# Patient Record
Sex: FEMALE | Race: WHITE | NOT HISPANIC OR LATINO | URBAN - METROPOLITAN AREA
[De-identification: names, ages, dates, MRNs, and addresses within clinical notes are randomized per-mention and may not be internally consistent; named-entity substitution may affect disease eponyms.]

---

## 2024-02-12 ENCOUNTER — TRANSFERRED RECORDS (OUTPATIENT)
Dept: HEALTH INFORMATION MANAGEMENT | Facility: CLINIC | Age: 24
End: 2024-02-12

## 2024-02-21 ENCOUNTER — TELEPHONE (OUTPATIENT)
Dept: OBGYN | Facility: CLINIC | Age: 24
End: 2024-02-21
Payer: COMMERCIAL

## 2024-02-21 NOTE — TELEPHONE ENCOUNTER
[facility] Call Center    Phone Message    May a detailed message be left on voicemail: yes     Reason for Call: Other: Pt is looking to establish care and find a provider that will perform a bilateral salpingectomy. Please contact pt.       Action Taken: Message routed to:  Other: S    Travel Screening: Not Applicable

## 2024-03-12 ENCOUNTER — OFFICE VISIT (OUTPATIENT)
Dept: OBGYN | Facility: CLINIC | Age: 24
End: 2024-03-12
Attending: OBSTETRICS & GYNECOLOGY
Payer: COMMERCIAL

## 2024-03-12 ENCOUNTER — TELEPHONE (OUTPATIENT)
Dept: OBGYN | Facility: CLINIC | Age: 24
End: 2024-03-12
Payer: COMMERCIAL

## 2024-03-12 ENCOUNTER — PATIENT OUTREACH (OUTPATIENT)
Dept: ONCOLOGY | Facility: CLINIC | Age: 24
End: 2024-03-12
Payer: COMMERCIAL

## 2024-03-12 VITALS
HEIGHT: 65 IN | WEIGHT: 160 LBS | BODY MASS INDEX: 26.66 KG/M2 | HEART RATE: 103 BPM | SYSTOLIC BLOOD PRESSURE: 133 MMHG | DIASTOLIC BLOOD PRESSURE: 82 MMHG

## 2024-03-12 DIAGNOSIS — Z30.2 REQUEST FOR STERILIZATION: Primary | ICD-10-CM

## 2024-03-12 DIAGNOSIS — Z80.3 FAMILY HISTORY OF MALIGNANT NEOPLASM OF BREAST: ICD-10-CM

## 2024-03-12 PROCEDURE — 99213 OFFICE O/P EST LOW 20 MIN: CPT | Performed by: OBSTETRICS & GYNECOLOGY

## 2024-03-12 PROCEDURE — 99203 OFFICE O/P NEW LOW 30 MIN: CPT | Performed by: OBSTETRICS & GYNECOLOGY

## 2024-03-12 RX ORDER — HYDROXYZINE PAMOATE 50 MG/1
CAPSULE ORAL
COMMUNITY
Start: 2024-02-26 | End: 2024-07-02

## 2024-03-12 RX ORDER — HYDROXYZINE HYDROCHLORIDE 25 MG/1
TABLET, FILM COATED ORAL
COMMUNITY
End: 2024-03-12

## 2024-03-12 RX ORDER — KETOCONAZOLE 20 MG/ML
SHAMPOO TOPICAL
COMMUNITY
Start: 2024-01-08

## 2024-03-12 RX ORDER — DUPILUMAB 300 MG/2ML
INJECTION, SOLUTION SUBCUTANEOUS
COMMUNITY

## 2024-03-12 RX ORDER — DEXTROAMPHETAMINE SACCHARATE, AMPHETAMINE ASPARTATE MONOHYDRATE, DEXTROAMPHETAMINE SULFATE AND AMPHETAMINE SULFATE 2.5; 2.5; 2.5; 2.5 MG/1; MG/1; MG/1; MG/1
20 CAPSULE, EXTENDED RELEASE ORAL
COMMUNITY
Start: 2023-06-06 | End: 2024-03-12

## 2024-03-12 RX ORDER — VENLAFAXINE HYDROCHLORIDE 37.5 MG/1
CAPSULE, EXTENDED RELEASE ORAL
COMMUNITY
Start: 2024-02-26 | End: 2024-07-02

## 2024-03-12 RX ORDER — VENLAFAXINE HYDROCHLORIDE 75 MG/1
CAPSULE, EXTENDED RELEASE ORAL
COMMUNITY
End: 2024-07-02

## 2024-03-12 RX ORDER — DEXTROAMPHETAMINE SACCHARATE, AMPHETAMINE ASPARTATE, DEXTROAMPHETAMINE SULFATE AND AMPHETAMINE SULFATE 5; 5; 5; 5 MG/1; MG/1; MG/1; MG/1
TABLET ORAL
COMMUNITY
Start: 2023-07-12 | End: 2024-03-12

## 2024-03-12 RX ORDER — DEXTROAMPHETAMINE SACCHARATE, AMPHETAMINE ASPARTATE MONOHYDRATE, DEXTROAMPHETAMINE SULFATE AND AMPHETAMINE SULFATE 5; 5; 5; 5 MG/1; MG/1; MG/1; MG/1
CAPSULE, EXTENDED RELEASE ORAL
COMMUNITY
Start: 2024-02-12 | End: 2024-06-12

## 2024-03-12 RX ORDER — DEXTROAMPHETAMINE SACCHARATE, AMPHETAMINE ASPARTATE, DEXTROAMPHETAMINE SULFATE AND AMPHETAMINE SULFATE 1.25; 1.25; 1.25; 1.25 MG/1; MG/1; MG/1; MG/1
TABLET ORAL
COMMUNITY
End: 2024-03-12

## 2024-03-12 RX ORDER — ACETAMINOPHEN 325 MG/1
975 TABLET ORAL ONCE
Status: CANCELLED | OUTPATIENT
Start: 2024-03-12 | End: 2024-03-12

## 2024-03-12 RX ORDER — ATOMOXETINE 25 MG/1
CAPSULE ORAL
COMMUNITY
End: 2024-03-12

## 2024-03-12 RX ORDER — TRIAMCINOLONE ACETONIDE 1 MG/G
OINTMENT TOPICAL
COMMUNITY

## 2024-03-12 RX ORDER — ATOMOXETINE 40 MG/1
CAPSULE ORAL
COMMUNITY
End: 2024-03-12

## 2024-03-12 RX ORDER — AMMONIUM LACTATE 12 G/100G
CREAM TOPICAL
COMMUNITY

## 2024-03-12 ASSESSMENT — ANXIETY QUESTIONNAIRES
5. BEING SO RESTLESS THAT IT IS HARD TO SIT STILL: NOT AT ALL
2. NOT BEING ABLE TO STOP OR CONTROL WORRYING: SEVERAL DAYS
1. FEELING NERVOUS, ANXIOUS, OR ON EDGE: SEVERAL DAYS
IF YOU CHECKED OFF ANY PROBLEMS ON THIS QUESTIONNAIRE, HOW DIFFICULT HAVE THESE PROBLEMS MADE IT FOR YOU TO DO YOUR WORK, TAKE CARE OF THINGS AT HOME, OR GET ALONG WITH OTHER PEOPLE: NOT DIFFICULT AT ALL
8. IF YOU CHECKED OFF ANY PROBLEMS, HOW DIFFICULT HAVE THESE MADE IT FOR YOU TO DO YOUR WORK, TAKE CARE OF THINGS AT HOME, OR GET ALONG WITH OTHER PEOPLE?: NOT DIFFICULT AT ALL
GAD7 TOTAL SCORE: 6
6. BECOMING EASILY ANNOYED OR IRRITABLE: SEVERAL DAYS
3. WORRYING TOO MUCH ABOUT DIFFERENT THINGS: SEVERAL DAYS
GAD7 TOTAL SCORE: 6
7. FEELING AFRAID AS IF SOMETHING AWFUL MIGHT HAPPEN: SEVERAL DAYS
7. FEELING AFRAID AS IF SOMETHING AWFUL MIGHT HAPPEN: SEVERAL DAYS
4. TROUBLE RELAXING: SEVERAL DAYS

## 2024-03-12 ASSESSMENT — PATIENT HEALTH QUESTIONNAIRE - PHQ9
SUM OF ALL RESPONSES TO PHQ QUESTIONS 1-9: 3
10. IF YOU CHECKED OFF ANY PROBLEMS, HOW DIFFICULT HAVE THESE PROBLEMS MADE IT FOR YOU TO DO YOUR WORK, TAKE CARE OF THINGS AT HOME, OR GET ALONG WITH OTHER PEOPLE: NOT DIFFICULT AT ALL
SUM OF ALL RESPONSES TO PHQ QUESTIONS 1-9: 3

## 2024-03-12 NOTE — PATIENT INSTRUCTIONS
Thank you for trusting us with your care!     If you need to contact us for questions about:  Symptoms, Scheduling & Medical Questions; Non-urgent (2-3 day response) Thais message, Urgent (needing response today) [phone] (if after 3:30pm next day response)   Prescriptions: Please call your Pharmacy   Billing: [facility] [phone] or [facility]:[phone]

## 2024-03-12 NOTE — LETTER
3/12/2024       RE: Nancy Smith  [address]  [facility] [address]     Dear Colleague,    Thank you for referring your patient, Nancy Smith, to the [facility] [place] at [facility]. Please see a copy of my visit note below.    Chief Complaint   Patient presents with    Establish Care     Tubal ligation consult    Sharon Rosario LPN   Answers submitted by the patient for this visit:  Patient Health Questionnaire (Submitted on 3/12/2024)  If you checked off any problems, how difficult have these problems made it for you to do your work, take care of things at home, or get along with other people?: Not difficult at all  PHQ9 TOTAL SCORE: 3  LIDIA-7 (Submitted on 3/12/2024)  LIDIA 7 TOTAL SCORE: 6      [facility]  Gynecology Visit    Reason for Visit: Establish care and discuss bilateral salpingectomy    HPI:    Nancy Smith is a 24 year old female, G0, here to establish care and discuss bilateral salpingectomy. Patient began using contraception at 15yo with a hormonal OCP for 5 years then switch to a hormonal IUD at 20yo to present. Patient does not wish to have children, has not felt peace with her body due to severe fear of becoming pregnant and would not want to bring a child into current world environment. Patient has thought about potential of regretting decision, with her deciding she would prefer to regret this procedure than to have children, and would consider adoption if she ever changes her mind. Discussion of other forms of contraception that are equally as efficient in preventing pregnancy were discussed, with patient declining and wishing to proceed with sterilization and removal of IUD. Patient currently has light menstrual spotting once a month, typically at the end of the month, without abdominal pain. Denies nausea, vomiting. Has occasional headaches, attributes them to poor eating habits and Adderall appetite  "suppression. Has a mild bilateral hand tremor at rest and patient notices tremor when active but does not disrupt abilities. Patient reports has had recent Pap [facility], and wishes to have genetic testing for BRCA1 due to family history of mom with breast and cervical cancer, and report of other family with cancer history.    Remaining ROS negative.      OBHx  OB History    Para Term  AB Living   0 0 0 0 0 0   SAB IAB Ectopic Multiple Live Births   0 0 0 0 0       Past Medical History:   Diagnosis Date    ADHD (attention deficit hyperactivity disorder)        Past Surgical History:   Procedure Laterality Date    DENTAL SURGERY           Current Outpatient Medications:     ammonium lactate (AMLACTIN) 12 % external cream, , Disp: , Rfl:     amphetamine-dextroamphetamine (ADDERALL XR) 20 MG 24 hr capsule, , Disp: , Rfl:     DUPIXENT 300 MG/2ML prefilled pen, , Disp: , Rfl:     hydrOXYzine (VISTARIL) 50 MG capsule, , Disp: , Rfl:     ketoconazole (NIZORAL) 2 % external shampoo, , Disp: , Rfl:     metroNIDAZOLE (METROCREAM) 0.75 % external cream, , Disp: , Rfl:     sertraline (ZOLOFT) 50 MG tablet, , Disp: , Rfl:     triamcinolone (KENALOG) 0.1 % external ointment, , Disp: , Rfl:     venlafaxine (EFFEXOR XR) 37.5 MG 24 hr capsule, , Disp: , Rfl:     venlafaxine (EFFEXOR XR) 75 MG 24 hr capsule, , Disp: , Rfl:     Family History   Problem Relation Age of Onset    Breast Cancer Mother      Social History     Tobacco Use    Smoking status: Never    Smokeless tobacco: Never   Substance Use Topics    Alcohol use: Yes     Alcohol/week: 1.0 standard drink of alcohol     Types: 1 Glasses of wine per week    Drug use: Yes     Types: Marijuana       Physical Exam  /82   Pulse 103   Ht 1.645 m (5' 4.75\")   Wt 72.6 kg (160 lb)   BMI 26.83 kg/m    Gen: Well-appearing, NAD  HEENT: Normocephalic, atraumatic  CV:  RRR, no m/r/g auscultated  Pulm: CTAB, no w/r/r auscultated  Abd: Soft, " non-tender, non-distended  Ext: No LE edema, extremities warm and well perfused    Assessment/Plan:  Nancy Smith is a 24 year old female, G0, here to establish care and discuss bilateral salpingectomy for permanent contraception.     Nancy Smith requests surgical sterilization. We discussed that LARC methods of contraception, such as IUDs and implants, provide the same efficacy as permanent sterilization, are reversible and avoid the risks of general anesthesia, and could be placed today in the office. We discussed that surgical sterilization is permanent and irreversible, and that there is a possibility of regretting this decision in the future. I recommended laparoscopic bilateral salpingectomy, rather than tubal ligation, because this avoids the risk of procedure failure, and because of evidence that removing the entire fallopian tube can reduce the risk of ovarian cancer in the future. However, if salpingectomy is not possible at the time of surgery, I would still perform a tubal ligation in order to accomplish the goal of sterilization. We discussed the risks of surgery include deep vein thrombosis, injury to bowel, bladder, ureters, nerves, and blood vessels requiring repair or reoperation for repair. After this discussion, Nancy Smith still desired surgical sterilization, and agreed to the inherent risks of the procedure. I answered all of her questions and we signed the FTP consent form. We will plan to remove the IUD at the time of the laparoscopic bilateral salpingectomy.     We then discussed pre- and post-operative instructions. I performed a preoperative physical exam today and she needs no additional cardiovascular testing prior to surgery. If surgery is scheduled > 30d from today, then she understands she will need an updated preoperative physical exam.    I placed a referral to genetic counseling to discuss her family history and potential for BRCA testing. Nancy with start evaluation for bilateral  hand tremor with family medicine here at [facility].    Staffed with Dr. Diamond Matias, MS3     OBGYN Attending Addendum    I appreciate the note above by medical student, Vincenzo Matias.  I, Diamond Tam, was present with the medical student, who participated in the service and in the documentation of the note. I have verified the history and personally performed the physical exam and medical decision making. I have edited accordingly and agree with the assessment and plan of care as documented in the note.    Diamond Tam MD, MSCI    [facility]  03/12/24

## 2024-03-12 NOTE — PROGRESS NOTES
Writer received Cancer Risk Management Program referral, referred for:    desires brca testing due to family history of breast cancer      Reviewed for appropriate plan, and sent to New Patient Scheduling for completion.

## 2024-03-12 NOTE — PROGRESS NOTES
Chief Complaint   Patient presents with    Establish Care     Tubal ligation consult    Sharon Rosario LPN   Answers submitted by the patient for this visit:  Patient Health Questionnaire (Submitted on 3/12/2024)  If you checked off any problems, how difficult have these problems made it for you to do your work, take care of things at home, or get along with other people?: Not difficult at all  PHQ9 TOTAL SCORE: 3  LIDIA-7 (Submitted on 3/12/2024)  LIDIA 7 TOTAL SCORE: 6

## 2024-03-12 NOTE — PROGRESS NOTES
[facility]  Gynecology Visit    Reason for Visit: Establish care and discuss bilateral salpingectomy    HPI:    Nancy Smith is a 24 year old female, G0, here to establish care and discuss bilateral salpingectomy. Patient began using contraception at 15yo with a hormonal OCP for 5 years then switch to a hormonal IUD at 20yo to present. Patient does not wish to have children, has not felt peace with her body due to severe fear of becoming pregnant and would not want to bring a child into current world environment. Patient has thought about potential of regretting decision, with her deciding she would prefer to regret this procedure than to have children, and would consider adoption if she ever changes her mind. Discussion of other forms of contraception that are equally as efficient in preventing pregnancy were discussed, with patient declining and wishing to proceed with sterilization and removal of IUD. Patient currently has light menstrual spotting once a month, typically at the end of the month, without abdominal pain. Denies nausea, vomiting. Has occasional headaches, attributes them to poor eating habits and Adderall appetite suppression. Has a mild bilateral hand tremor at rest and patient notices tremor when active but does not disrupt abilities. Patient reports has had recent Pap smear [facility] a [facility], and wishes to have genetic testing for BRCA1 due to family history of mom with breast and cervical cancer, and report of other family with cancer history.    Remaining ROS negative.      OBHx  OB History    Para Term  AB Living   0 0 0 0 0 0   SAB IAB Ectopic Multiple Live Births   0 0 0 0 0       Past Medical History:   Diagnosis Date    ADHD (attention deficit hyperactivity disorder)        Past Surgical History:   Procedure Laterality Date    DENTAL SURGERY           Current Outpatient Medications:     ammonium lactate (AMLACTIN) 12 % external cream, , Disp: , Rfl:      "amphetamine-dextroamphetamine (ADDERALL XR) 20 MG 24 hr capsule, , Disp: , Rfl:     DUPIXENT 300 MG/2ML prefilled pen, , Disp: , Rfl:     hydrOXYzine (VISTARIL) 50 MG capsule, , Disp: , Rfl:     ketoconazole (NIZORAL) 2 % external shampoo, , Disp: , Rfl:     metroNIDAZOLE (METROCREAM) 0.75 % external cream, , Disp: , Rfl:     sertraline (ZOLOFT) 50 MG tablet, , Disp: , Rfl:     triamcinolone (KENALOG) 0.1 % external ointment, , Disp: , Rfl:     venlafaxine (EFFEXOR XR) 37.5 MG 24 hr capsule, , Disp: , Rfl:     venlafaxine (EFFEXOR XR) 75 MG 24 hr capsule, , Disp: , Rfl:     Family History   Problem Relation Age of Onset    Breast Cancer Mother      Social History     Tobacco Use    Smoking status: Never    Smokeless tobacco: Never   Substance Use Topics    Alcohol use: Yes     Alcohol/week: 1.0 standard drink of alcohol     Types: 1 Glasses of wine per week    Drug use: Yes     Types: Marijuana       Physical Exam  /82   Pulse 103   Ht 1.645 m (5' 4.75\")   Wt 72.6 kg (160 lb)   BMI 26.83 kg/m    Gen: Well-appearing, NAD  HEENT: Normocephalic, atraumatic  CV:  RRR, no m/r/g auscultated  Pulm: CTAB, no w/r/r auscultated  Abd: Soft, non-tender, non-distended  Ext: No LE edema, extremities warm and well perfused    Assessment/Plan:  Nancy Smith is a 24 year old female, G0, here to establish care and discuss bilateral salpingectomy for permanent contraception.     Nancy Smith requests surgical sterilization. We discussed that LARC methods of contraception, such as IUDs and implants, provide the same efficacy as permanent sterilization, are reversible and avoid the risks of general anesthesia, and could be placed today in the office. We discussed that surgical sterilization is permanent and irreversible, and that there is a possibility of regretting this decision in the future. I recommended laparoscopic bilateral salpingectomy, rather than tubal ligation, because this avoids the risk of procedure failure, and " because of evidence that removing the entire fallopian tube can reduce the risk of ovarian cancer in the future. However, if salpingectomy is not possible at the time of surgery, I would still perform a tubal ligation in order to accomplish the goal of sterilization. We discussed the risks of surgery include deep vein thrombosis, injury to bowel, bladder, ureters, nerves, and blood vessels requiring repair or reoperation for repair. After this discussion, Nancy Smith still desired surgical sterilization, and agreed to the inherent risks of the procedure. I answered all of her questions and we signed the FTP consent form. We will plan to remove the IUD at the time of the laparoscopic bilateral salpingectomy.     We then discussed pre- and post-operative instructions. I performed a preoperative physical exam today and she needs no additional cardiovascular testing prior to surgery. If surgery is scheduled > 30d from today, then she understands she will need an updated preoperative physical exam.    I placed a referral to genetic counseling to discuss her family history and potential for BRCA testing. Nancy barnard start evaluation for bilateral hand tremor with family medicine here at [facility].    Staffed with Dr. Diamond Matias, MS3     OBGYN Attending Addendum    I appreciate the note above by medical student, Vincenzo Matias.  I, Diamond Tam, was present with the medical student, who participated in the service and in the documentation of the note. I have verified the history and personally performed the physical exam and medical decision making. I have edited accordingly and agree with the assessment and plan of care as documented in the note.    Diamond Tam MD, MSCI    [facility]  03/12/24

## 2024-03-13 ENCOUNTER — TELEPHONE (OUTPATIENT)
Dept: OBGYN | Facility: CLINIC | Age: 24
End: 2024-03-13
Payer: COMMERCIAL

## 2024-03-13 PROBLEM — Z30.2 REQUEST FOR STERILIZATION: Status: ACTIVE | Noted: 2024-03-12

## 2024-03-18 ENCOUNTER — MYC MEDICAL ADVICE (OUTPATIENT)
Dept: OBGYN | Facility: CLINIC | Age: 24
End: 2024-03-18
Payer: COMMERCIAL

## 2024-03-22 PROBLEM — F43.10 POSTTRAUMATIC STRESS DISORDER: Status: ACTIVE | Noted: 2019-04-19

## 2024-03-22 PROBLEM — F60.3 BORDERLINE PERSONALITY DISORDER (H): Status: ACTIVE | Noted: 2019-04-19

## 2024-03-26 ENCOUNTER — ANESTHESIA EVENT (OUTPATIENT)
Dept: SURGERY | Facility: AMBULATORY SURGERY CENTER | Age: 24
End: 2024-03-26
Payer: COMMERCIAL

## 2024-03-26 ENCOUNTER — LAB (OUTPATIENT)
Dept: LAB | Facility: CLINIC | Age: 24
End: 2024-03-26
Attending: FAMILY MEDICINE
Payer: COMMERCIAL

## 2024-03-26 ENCOUNTER — OFFICE VISIT (OUTPATIENT)
Dept: FAMILY MEDICINE | Facility: CLINIC | Age: 24
End: 2024-03-26
Attending: FAMILY MEDICINE
Payer: COMMERCIAL

## 2024-03-26 VITALS — HEIGHT: 65 IN | BODY MASS INDEX: 26.83 KG/M2

## 2024-03-26 DIAGNOSIS — R25.1 TREMOR: ICD-10-CM

## 2024-03-26 DIAGNOSIS — H61.23 BILATERAL IMPACTED CERUMEN: ICD-10-CM

## 2024-03-26 DIAGNOSIS — R25.1 TREMOR: Primary | ICD-10-CM

## 2024-03-26 PROBLEM — F60.3 BORDERLINE PERSONALITY DISORDER (H): Status: RESOLVED | Noted: 2019-04-19 | Resolved: 2024-03-26

## 2024-03-26 LAB
ANION GAP SERPL CALCULATED.3IONS-SCNC: 8 MMOL/L (ref 7–15)
BUN SERPL-MCNC: 11.2 MG/DL (ref 6–20)
CALCIUM SERPL-MCNC: 9 MG/DL (ref 8.6–10)
CHLORIDE SERPL-SCNC: 101 MMOL/L (ref 98–107)
CREAT SERPL-MCNC: 0.64 MG/DL (ref 0.51–0.95)
DEPRECATED HCO3 PLAS-SCNC: 29 MMOL/L (ref 22–29)
EGFRCR SERPLBLD CKD-EPI 2021: >90 ML/MIN/1.73M2
GLUCOSE SERPL-MCNC: 87 MG/DL (ref 70–99)
POTASSIUM SERPL-SCNC: 4.5 MMOL/L (ref 3.4–5.3)
SODIUM SERPL-SCNC: 138 MMOL/L (ref 135–145)
TSH SERPL DL<=0.005 MIU/L-ACNC: 2.07 UIU/ML (ref 0.3–4.2)

## 2024-03-26 PROCEDURE — 36415 COLL VENOUS BLD VENIPUNCTURE: CPT

## 2024-03-26 PROCEDURE — 99204 OFFICE O/P NEW MOD 45 MIN: CPT | Performed by: FAMILY MEDICINE

## 2024-03-26 PROCEDURE — 99213 OFFICE O/P EST LOW 20 MIN: CPT | Performed by: FAMILY MEDICINE

## 2024-03-26 PROCEDURE — 84443 ASSAY THYROID STIM HORMONE: CPT

## 2024-03-26 PROCEDURE — 80048 BASIC METABOLIC PNL TOTAL CA: CPT

## 2024-03-26 NOTE — PROGRESS NOTES
"CC: Consult (Hand tremors) and Establish Care      SUBJECTIVE: Nancy is a 24 year old female who comes in with the following concerns:    Tremor. For the last 6 years. Started on Lexapro at age 18 and that's when the tremors started. They don't disrupt her daily life. But has been consistent. In her hands. Significantly worse when she's really anxious.   Had an appointment with a neurologist but had to cancel it and forgot to reschedule it. Resting tremor. No weakness, numbness, or tingling in the extremities. No FH of tremor or Parkinson's disease.   Medications that could cause tremor: Adderall XR 20 mg daily, venlafaxine  Alcohol use: socially - no change in tremor with alcohol  Drug use: smokes marijuana daily - no change in tremor.   Somewhat worse with Adderall.   Switched from Lexapro to Zoloft to Effexor. No change in tremor.   Tremor exacerbated by anxiety. Tremor is alleviated by nothing. Symptoms occur daily.    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 Tested at [facility]. Prescribed by Giovana Ferro. Adderall XR 20 mg daily.     PTSD. Hydroxyzine 50 mg at bedtime. Venlafaxine 75 + 37.5 mg daily.   PDMP Review         Value Time User    State PDMP site checked  Yes 3/26/2024 10:13 AM Ingrid Hernández MD            OBJECTIVE:   Ht 1.645 m (5' 4.75\")   BMI 26.83 kg/m    General: Alert and oriented in no acute distress.  Lymph: No anterior cervical lymphadenopathy.   Eyes: PERRL. EOMI.   ENT: TMs not visualized bilaterally due to cerumen. Oropharynx moist, mildly erythematous, without lesions or exudate. Supple neck.  Neuro: Grossly intact, nonfocal.  Cardio: RRR, normal S1 and S2, without murmurs, rubs, or gallops appreciated.    Resp: CTA bilaterally. Normal respiratory effort.   Psych: Mood and affect appropriate.      ASSESSMENT/PLAN:   Nancy was seen today for consult and establish care. PMH, PSH, FH, SH, medications, and allergies reviewed and updated in Epic. JIMMIE signed for " [facility].     Tremor  Likely physiologic tremor exacerbated by medications (Adderall, venlafaxine) and anxiety. Check TSH and BMP to r/o thyroid or electrolyte abnormalities contributing. Referral to neurology for confirmation of diagnosis. Worrisome signs and symptoms were discussed with Nancy and she was instructed to return to the clinic for concerning symptoms or to call with questions.  -     TSH - Reflex to FT4; Future  -     Adult Neurology  Referral; Future  -     Basic Metabolic Panel; Future    Bilateral impacted cerumen  Unable to remove cerumen in clinic today as necessary equipment is missing from clinic. Referral to ENT.   -     Adult ENT  Referral; Future      Ingrid Hernández MD  Family Medicine

## 2024-03-26 NOTE — ANESTHESIA PREPROCEDURE EVALUATION
"Anesthesia Pre-Procedure Evaluation    Patient: Nancy Smith   MRN: [id number] : 2000        Procedure : Procedure(s):  BILATERAL SALPINGECTOMY, LAPAROSCOPIC AND REMOVAL OF INTRAUTERINE DEVICE          Past Medical History:   Diagnosis Date    ADHD (attention deficit hyperactivity disorder)     Flexural eczema 2016    Posttraumatic stress disorder 2019    Seborrheic dermatitis 2010      Past Surgical History:   Procedure Laterality Date    DENTAL SURGERY        No Known Allergies   Social History     Tobacco Use    Smoking status: Never    Smokeless tobacco: Never   Substance Use Topics    Alcohol use: Yes     Alcohol/week: 1.0 standard drink of alcohol     Types: 1 Glasses of wine per week     Comment: less than 1 week, purely socially      Wt Readings from Last 1 Encounters:   24 72.6 kg (160 lb)           Physical Exam    Airway        Mallampati: II       Respiratory Devices and Support         Dental       (+) Minor Abnormalities - some fillings, tiny chips      Cardiovascular          Rhythm and rate: regular     Pulmonary           breath sounds clear to auscultation           OUTSIDE LABS:  CBC: No results found for: \"WBC\", \"HGB\", \"HCT\", \"PLT\"  BMP: No results found for: \"NA\", \"POTASSIUM\", \"CHLORIDE\", \"CO2\", \"BUN\", \"CR\", \"GLC\"  COAGS: No results found for: \"PTT\", \"INR\", \"FIBR\"  POC: No results found for: \"BGM\", \"HCG\", \"HCGS\"  HEPATIC: No results found for: \"ALBUMIN\", \"PROTTOTAL\", \"ALT\", \"AST\", \"GGT\", \"ALKPHOS\", \"BILITOTAL\", \"BILIDIRECT\", \"XAVI\"  OTHER: No results found for: \"PH\", \"LACT\", \"A1C\", \"CLIF\", \"PHOS\", \"MAG\", \"LIPASE\", \"AMYLASE\", \"TSH\", \"T4\", \"T3\", \"CRP\", \"SED\"    Anesthesia Plan    ASA Status:  2    NPO Status:  NPO Appropriate    Anesthesia Type: General.     - Airway: ETT   Induction: Intravenous.   Maintenance: TIVA.        Consents    Anesthesia Plan(s) and associated risks, benefits, and realistic alternatives discussed. Questions answered and " "patient/representative(s) expressed understanding.     - Discussed:     - Discussed with:  Patient            Postoperative Care    Pain management: Oral pain medications, IV analgesics, Multi-modal analgesia.   PONV prophylaxis: Ondansetron (or other 5HT-3), Dexamethasone or Solumedrol     Comments:               Jose A Pollock MD    I have reviewed the pertinent notes and labs in the chart from the past 30 days and (re)examined the patient.  Any updates or changes from those notes are reflected in this note.              # Overweight: Estimated body mass index is 26.83 kg/m  as calculated from the following:    Height as of 3/26/24: 1.645 m (5' 4.75\").    Weight as of 3/12/24: 72.6 kg (160 lb).      "

## 2024-03-27 ENCOUNTER — HOSPITAL ENCOUNTER (OUTPATIENT)
Facility: AMBULATORY SURGERY CENTER | Age: 24
Discharge: HOME | End: 2024-03-27
Attending: OBSTETRICS & GYNECOLOGY
Payer: COMMERCIAL

## 2024-03-27 ENCOUNTER — ANESTHESIA (OUTPATIENT)
Dept: SURGERY | Facility: AMBULATORY SURGERY CENTER | Age: 24
End: 2024-03-27
Payer: COMMERCIAL

## 2024-03-27 VITALS
SYSTOLIC BLOOD PRESSURE: 122 MMHG | HEART RATE: 98 BPM | TEMPERATURE: 97.4 F | WEIGHT: 160 LBS | OXYGEN SATURATION: 99 % | RESPIRATION RATE: 16 BRPM | DIASTOLIC BLOOD PRESSURE: 78 MMHG | HEIGHT: 65 IN | BODY MASS INDEX: 26.66 KG/M2

## 2024-03-27 DIAGNOSIS — Z98.890 S/P LAPAROSCOPY: Primary | ICD-10-CM

## 2024-03-27 DIAGNOSIS — Z30.2 REQUEST FOR STERILIZATION: ICD-10-CM

## 2024-03-27 LAB
HCG UR QL: NEGATIVE
INTERNAL QC OK POCT: NORMAL
POCT KIT EXPIRATION DATE: NORMAL
POCT KIT LOT NUMBER: NORMAL

## 2024-03-27 PROCEDURE — 81025 URINE PREGNANCY TEST: CPT | Performed by: PATHOLOGY

## 2024-03-27 PROCEDURE — 58301 REMOVE INTRAUTERINE DEVICE: CPT

## 2024-03-27 PROCEDURE — 58661 LAPAROSCOPY REMOVE ADNEXA: CPT | Mod: 50 | Performed by: OBSTETRICS & GYNECOLOGY

## 2024-03-27 PROCEDURE — 88302 TISSUE EXAM BY PATHOLOGIST: CPT | Mod: 26 | Performed by: PATHOLOGY

## 2024-03-27 PROCEDURE — 88300 SURGICAL PATH GROSS: CPT | Mod: 26 | Performed by: PATHOLOGY

## 2024-03-27 PROCEDURE — 58301 REMOVE INTRAUTERINE DEVICE: CPT | Mod: GC | Performed by: OBSTETRICS & GYNECOLOGY

## 2024-03-27 PROCEDURE — 88300 SURGICAL PATH GROSS: CPT | Mod: TC | Performed by: OBSTETRICS & GYNECOLOGY

## 2024-03-27 PROCEDURE — 58561 HYSTEROSCOPY REMOVE MYOMA: CPT

## 2024-03-27 PROCEDURE — 58661 LAPAROSCOPY REMOVE ADNEXA: CPT | Performed by: ANESTHESIOLOGY

## 2024-03-27 PROCEDURE — 58661 LAPAROSCOPY REMOVE ADNEXA: CPT

## 2024-03-27 PROCEDURE — 58661 LAPAROSCOPY REMOVE ADNEXA: CPT | Performed by: NURSE ANESTHETIST, CERTIFIED REGISTERED

## 2024-03-27 RX ORDER — ONDANSETRON 2 MG/ML
4 INJECTION INTRAMUSCULAR; INTRAVENOUS EVERY 30 MIN PRN
Status: DISCONTINUED | OUTPATIENT
Start: 2024-03-27 | End: 2024-03-28 | Stop reason: HOSPADM

## 2024-03-27 RX ORDER — SODIUM CHLORIDE, SODIUM LACTATE, POTASSIUM CHLORIDE, CALCIUM CHLORIDE 600; 310; 30; 20 MG/100ML; MG/100ML; MG/100ML; MG/100ML
INJECTION, SOLUTION INTRAVENOUS CONTINUOUS
Status: DISCONTINUED | OUTPATIENT
Start: 2024-03-27 | End: 2024-03-27 | Stop reason: HOSPADM

## 2024-03-27 RX ORDER — OXYCODONE HYDROCHLORIDE 5 MG/1
5 TABLET ORAL
Status: DISCONTINUED | OUTPATIENT
Start: 2024-03-27 | End: 2024-03-28 | Stop reason: HOSPADM

## 2024-03-27 RX ORDER — OXYCODONE HYDROCHLORIDE 5 MG/1
5 TABLET ORAL EVERY 6 HOURS PRN
Qty: 8 TABLET | Refills: 0 | Status: SHIPPED | OUTPATIENT
Start: 2024-03-27 | End: 2024-03-30

## 2024-03-27 RX ORDER — ACETAMINOPHEN 325 MG/1
975 TABLET ORAL ONCE
Status: COMPLETED | OUTPATIENT
Start: 2024-03-27 | End: 2024-03-27

## 2024-03-27 RX ORDER — IBUPROFEN 800 MG/1
800 TABLET, FILM COATED ORAL EVERY 6 HOURS PRN
Qty: 30 TABLET | Refills: 0 | Status: SHIPPED | OUTPATIENT
Start: 2024-03-27

## 2024-03-27 RX ORDER — NALOXONE HYDROCHLORIDE 0.4 MG/ML
0.1 INJECTION, SOLUTION INTRAMUSCULAR; INTRAVENOUS; SUBCUTANEOUS
Status: DISCONTINUED | OUTPATIENT
Start: 2024-03-27 | End: 2024-03-28 | Stop reason: HOSPADM

## 2024-03-27 RX ORDER — SODIUM CHLORIDE, SODIUM LACTATE, POTASSIUM CHLORIDE, CALCIUM CHLORIDE 600; 310; 30; 20 MG/100ML; MG/100ML; MG/100ML; MG/100ML
INJECTION, SOLUTION INTRAVENOUS CONTINUOUS
Status: DISCONTINUED | OUTPATIENT
Start: 2024-03-27 | End: 2024-03-28 | Stop reason: HOSPADM

## 2024-03-27 RX ORDER — KETOROLAC TROMETHAMINE 30 MG/ML
INJECTION, SOLUTION INTRAMUSCULAR; INTRAVENOUS PRN
Status: DISCONTINUED | OUTPATIENT
Start: 2024-03-27 | End: 2024-03-27

## 2024-03-27 RX ORDER — HYDROMORPHONE HYDROCHLORIDE 1 MG/ML
0.2 INJECTION, SOLUTION INTRAMUSCULAR; INTRAVENOUS; SUBCUTANEOUS EVERY 5 MIN PRN
Status: DISCONTINUED | OUTPATIENT
Start: 2024-03-27 | End: 2024-03-28 | Stop reason: HOSPADM

## 2024-03-27 RX ORDER — ONDANSETRON 4 MG/1
4 TABLET, ORALLY DISINTEGRATING ORAL EVERY 30 MIN PRN
Status: DISCONTINUED | OUTPATIENT
Start: 2024-03-27 | End: 2024-03-28 | Stop reason: HOSPADM

## 2024-03-27 RX ORDER — ACETAMINOPHEN 325 MG/1
975 TABLET ORAL ONCE
Status: DISCONTINUED | OUTPATIENT
Start: 2024-03-27 | End: 2024-03-27 | Stop reason: HOSPADM

## 2024-03-27 RX ORDER — FENTANYL CITRATE 50 UG/ML
INJECTION, SOLUTION INTRAMUSCULAR; INTRAVENOUS PRN
Status: DISCONTINUED | OUTPATIENT
Start: 2024-03-27 | End: 2024-03-27

## 2024-03-27 RX ORDER — PROPOFOL 10 MG/ML
INJECTION, EMULSION INTRAVENOUS PRN
Status: DISCONTINUED | OUTPATIENT
Start: 2024-03-27 | End: 2024-03-27

## 2024-03-27 RX ORDER — OXYCODONE HYDROCHLORIDE 5 MG/1
5 TABLET ORAL
Status: COMPLETED | OUTPATIENT
Start: 2024-03-27 | End: 2024-03-27

## 2024-03-27 RX ORDER — ONDANSETRON 2 MG/ML
INJECTION INTRAMUSCULAR; INTRAVENOUS PRN
Status: DISCONTINUED | OUTPATIENT
Start: 2024-03-27 | End: 2024-03-27

## 2024-03-27 RX ORDER — DEXAMETHASONE SODIUM PHOSPHATE 4 MG/ML
INJECTION, SOLUTION INTRA-ARTICULAR; INTRALESIONAL; INTRAMUSCULAR; INTRAVENOUS; SOFT TISSUE PRN
Status: DISCONTINUED | OUTPATIENT
Start: 2024-03-27 | End: 2024-03-27

## 2024-03-27 RX ORDER — ACETAMINOPHEN 325 MG/1
975 TABLET ORAL ONCE
Status: DISCONTINUED | OUTPATIENT
Start: 2024-03-27 | End: 2024-03-28 | Stop reason: HOSPADM

## 2024-03-27 RX ORDER — OXYCODONE HYDROCHLORIDE 5 MG/1
10 TABLET ORAL
Status: DISCONTINUED | OUTPATIENT
Start: 2024-03-27 | End: 2024-03-28 | Stop reason: HOSPADM

## 2024-03-27 RX ORDER — FENTANYL CITRATE 50 UG/ML
50 INJECTION, SOLUTION INTRAMUSCULAR; INTRAVENOUS EVERY 5 MIN PRN
Status: DISCONTINUED | OUTPATIENT
Start: 2024-03-27 | End: 2024-03-28 | Stop reason: HOSPADM

## 2024-03-27 RX ORDER — LIDOCAINE HYDROCHLORIDE 20 MG/ML
INJECTION, SOLUTION INFILTRATION; PERINEURAL PRN
Status: DISCONTINUED | OUTPATIENT
Start: 2024-03-27 | End: 2024-03-27

## 2024-03-27 RX ORDER — HYDROMORPHONE HYDROCHLORIDE 1 MG/ML
0.4 INJECTION, SOLUTION INTRAMUSCULAR; INTRAVENOUS; SUBCUTANEOUS EVERY 5 MIN PRN
Status: DISCONTINUED | OUTPATIENT
Start: 2024-03-27 | End: 2024-03-28 | Stop reason: HOSPADM

## 2024-03-27 RX ORDER — FENTANYL CITRATE 50 UG/ML
25 INJECTION, SOLUTION INTRAMUSCULAR; INTRAVENOUS EVERY 5 MIN PRN
Status: DISCONTINUED | OUTPATIENT
Start: 2024-03-27 | End: 2024-03-28 | Stop reason: HOSPADM

## 2024-03-27 RX ORDER — ACETAMINOPHEN 325 MG/1
650 TABLET ORAL EVERY 6 HOURS
Qty: 50 TABLET | Refills: 0 | Status: SHIPPED | OUTPATIENT
Start: 2024-03-27

## 2024-03-27 RX ORDER — PROPOFOL 10 MG/ML
INJECTION, EMULSION INTRAVENOUS CONTINUOUS PRN
Status: DISCONTINUED | OUTPATIENT
Start: 2024-03-27 | End: 2024-03-27

## 2024-03-27 RX ORDER — LIDOCAINE 40 MG/G
CREAM TOPICAL
Status: DISCONTINUED | OUTPATIENT
Start: 2024-03-27 | End: 2024-03-27 | Stop reason: HOSPADM

## 2024-03-27 RX ORDER — BUPIVACAINE HYDROCHLORIDE 2.5 MG/ML
INJECTION, SOLUTION EPIDURAL; INFILTRATION; INTRACAUDAL PRN
Status: DISCONTINUED | OUTPATIENT
Start: 2024-03-27 | End: 2024-03-27 | Stop reason: HOSPADM

## 2024-03-27 RX ORDER — IBUPROFEN 200 MG
800 TABLET ORAL ONCE
Status: DISCONTINUED | OUTPATIENT
Start: 2024-03-27 | End: 2024-03-28 | Stop reason: HOSPADM

## 2024-03-27 RX ADMIN — PROPOFOL 100 MG: 10 INJECTION, EMULSION INTRAVENOUS at 08:01

## 2024-03-27 RX ADMIN — FENTANYL CITRATE 75 MCG: 50 INJECTION, SOLUTION INTRAMUSCULAR; INTRAVENOUS at 07:21

## 2024-03-27 RX ADMIN — KETOROLAC TROMETHAMINE 15 MG: 30 INJECTION, SOLUTION INTRAMUSCULAR; INTRAVENOUS at 07:59

## 2024-03-27 RX ADMIN — PROPOFOL 160 MG: 10 INJECTION, EMULSION INTRAVENOUS at 07:21

## 2024-03-27 RX ADMIN — Medication 50 MG: at 07:22

## 2024-03-27 RX ADMIN — ACETAMINOPHEN 975 MG: 325 TABLET ORAL at 06:36

## 2024-03-27 RX ADMIN — PROPOFOL 200 MCG/KG/MIN: 10 INJECTION, EMULSION INTRAVENOUS at 07:21

## 2024-03-27 RX ADMIN — FENTANYL CITRATE 25 MCG: 50 INJECTION, SOLUTION INTRAMUSCULAR; INTRAVENOUS at 07:43

## 2024-03-27 RX ADMIN — OXYCODONE HYDROCHLORIDE 5 MG: 5 TABLET ORAL at 08:36

## 2024-03-27 RX ADMIN — FENTANYL CITRATE 25 MCG: 50 INJECTION, SOLUTION INTRAMUSCULAR; INTRAVENOUS at 08:28

## 2024-03-27 RX ADMIN — LIDOCAINE HYDROCHLORIDE 60 MG: 20 INJECTION, SOLUTION INFILTRATION; PERINEURAL at 07:21

## 2024-03-27 RX ADMIN — DEXAMETHASONE SODIUM PHOSPHATE 4 MG: 4 INJECTION, SOLUTION INTRA-ARTICULAR; INTRALESIONAL; INTRAMUSCULAR; INTRAVENOUS; SOFT TISSUE at 07:30

## 2024-03-27 RX ADMIN — FENTANYL CITRATE 25 MCG: 50 INJECTION, SOLUTION INTRAMUSCULAR; INTRAVENOUS at 08:34

## 2024-03-27 RX ADMIN — SODIUM CHLORIDE, SODIUM LACTATE, POTASSIUM CHLORIDE, CALCIUM CHLORIDE: 600; 310; 30; 20 INJECTION, SOLUTION INTRAVENOUS at 06:57

## 2024-03-27 RX ADMIN — ONDANSETRON 4 MG: 2 INJECTION INTRAMUSCULAR; INTRAVENOUS at 07:54

## 2024-03-27 NOTE — ANESTHESIA PROCEDURE NOTES
Airway       Patient location during procedure: OR       Procedure Start/Stop Times: 3/27/2024 7:23 AM  Staff -        CRNA: Sindy Baeza APRN CRNA       Performed By: CRNA  Consent for Airway        Urgency: elective  Indications and Patient Condition       Indications for airway management: shira-procedural       Induction type:intravenous       Mask difficulty assessment: 1 - vent by mask    Final Airway Details       Final airway type: endotracheal airway       Successful airway: ETT - single and Oral  Endotracheal Airway Details        ETT size (mm): 7.0       Cuffed: yes       Successful intubation technique: direct laryngoscopy       DL Blade Type: MAC 3       Grade View of Cords: 1       Adjucts: stylet       Position: Right       Measured from: lips       Secured at (cm): 21       Bite block used: None    Post intubation assessment        Placement verified by: capnometry, equal breath sounds and chest rise        Number of attempts at approach: 1       Number of other approaches attempted: 0       Secured with: tape       Ease of procedure: easy       Dentition: Intact and Unchanged    Medication(s) Administered   Medication Administration Time: 3/27/2024 7:23 AM

## 2024-03-27 NOTE — ANESTHESIA POSTPROCEDURE EVALUATION
Patient: Nancy Smith    Procedure: Procedure(s):  BILATERAL SALPINGECTOMY, LAPAROSCOPIC AND REMOVAL OF INTRAUTERINE DEVICE       Anesthesia Type:  General    Note:  Disposition: Outpatient   Postop Pain Control: Uneventful            Sign Out: Well controlled pain   PONV: No   Neuro/Psych: Uneventful            Sign Out: Acceptable/Baseline neuro status   Airway/Respiratory: Uneventful            Sign Out: Acceptable/Baseline resp. status   CV/Hemodynamics: Uneventful            Sign Out: Acceptable CV status; No obvious hypovolemia; No obvious fluid overload   Other NRE: NONE   DID A NON-ROUTINE EVENT OCCUR?            Last vitals:  Vitals Value Taken Time   /77 03/27/24 0845   Temp 36.8  C (98.2  F) 03/27/24 0845   Pulse 98 03/27/24 0845   Resp 20 03/27/24 0845   SpO2 97 % 03/27/24 0845       Electronically Signed By: Jose A Pollock MD  March 27, 2024  12:51 PM

## 2024-03-27 NOTE — ANESTHESIA CARE TRANSFER NOTE
Patient: Nancy Smith    Procedure: Procedure(s):  BILATERAL SALPINGECTOMY, LAPAROSCOPIC AND REMOVAL OF INTRAUTERINE DEVICE       Diagnosis: Request for sterilization [Z30.2]  Diagnosis Additional Information: No value filed.    Anesthesia Type:   General     Note:    Oropharynx: oropharynx clear of all foreign objects and spontaneously breathing  Level of Consciousness: awake  Oxygen Supplementation: face mask  Level of Supplemental Oxygen (L/min / FiO2): 6  Independent Airway: airway patency satisfactory and stable  Dentition: dentition unchanged  Vital Signs Stable: post-procedure vital signs reviewed and stable  Report to RN Given: handoff report given  Patient transferred to: PACU    Handoff Report: Identifed the Patient, Identified the Reponsible Provider, Reviewed the pertinent medical history, Discussed the surgical course, Reviewed Intra-OP anesthesia mangement and issues during anesthesia, Set expectations for post-procedure period and Allowed opportunity for questions and acknowledgement of understanding    Vitals:  Vitals Value Taken Time   BP     Temp     Pulse     Resp 31 03/27/24 0813   SpO2 100 % 03/27/24 0813   Vitals shown include unfiled device data.    Electronically Signed By: MARINE Muse CRNA  March 27, 2024  8:15 AM

## 2024-03-27 NOTE — OP NOTE
[facility] Operative Report  Pre-operative diagnosis: Desire for sterilization  Post-operative diagnosis: Same   Procedure: Laparoscopic bilateral salpingectomy  Surgeon: Diamond Tam MD  Assistant(s): Adriano Sinclair MD - PGY4  Anesthesia:  GA  Estimated blood loss: 20 mL  Total IV fluids: 800 cc crystalloid   Total urine output: 100 cc clear  Drains: Lechuga catheter   Specimens:   ID Type Source Tests Collected by Time Destination   1 : IUD Implant Other OR DOCUMENTATION ONLY, SURGICAL PATHOLOGY EXAM Diamond Tam MD 3/27/2024  7:35 AM    2 : Bilateral Fallopian Tubes, Stitch on Right Fallopian Tube Tissue Fallopian Tube, Bilateral SURGICAL PATHOLOGY EXAM Diamond Tam MD 3/27/2024  7:52 AM      Findings: EUA revealed normal uterus. IUD removed intact. No adnexal masses noted. External genitalia, vaginal vault and cervix appeared normal. On laparoscopy, normal appearing liver edge, gallbladder, omentum, bowel. Normal appearing uterus, bilateral fallopian tubes and ovaries. Two areas of endometriosis noted, one on the right uterosacral ligament and one powder burn area on the right pelvic sidewall. Hemostatic surgical sites at end of case. Silver nitrate used on tenaculum sites for hemostasis.   Complications: None apparent  Condition: Stable, to PACU  Indications: Desires sterilization  Procedure:   The patient was taken to the operating room where she was prepped and draped in the usual sterile fashion. She then underwent GETA, and was positioned to the dorsal lithotomy position with yellow-fin stirrups. A time out was completed.  A lechuga catheter was placed into the bladder. A speculum was placed into the vagina. The anterior lip of the cervix was grasped with a single tooth tenaculum and a Aldridge uterine manipulator placed into the cervix. The speculum was removed.   Attention was then turned to the umbilicus, which was everted with towel clamps. A stab incision was made with scalpel. Sea  needle was introduced and CO2 gas connected with opening pressure of 4 mmHg. The laparoscope was inserted and a right lower quadrant 5 mm trocar was introduced under direct visualization. A 5 mm left lower quadrant port was introduced in a similar fashion. The patient was placed in steep Trendelenburg and bowel was retracted from pelvic with atraumatic grasper. Abdominal surveillance was completed noting normal upper abdominal anatomy.  The mesosalpinx was transected from the left fallopian tube with a ligasure. The fallopian tube was then transected at the cornua. The specimen was removed through the port. The process was repeated on the right fallopian tube. Hemostasis was noted. Abdomen was desufflated and. Camera and ports were then removed. The port site skin incisions were closed in a subcuticular fashion with 4.0 vicryl and skin glue.   A speculum was placed into the vagina and the tenaculum and uterine manipulator removed. Hemostasis was achieved with the application of silver nitrate and pressure. The speculum was removed. The lechuga catheter was removed.  There were no complications during the procedure. The patient went to the postanesthesia recovery room in stable condition.   Dr. Tam was scrubbed and present for the entire procedure.  Adriano Sinclair MD  Obstetrics, Gynecology, and Women's Health  PGY4  7:16 AM 03/27/2024    OBGYN Attending Addendum     I, Diamond Tam, was scrubbed and present for the entire procedure. I have reviewed Dr. Sinclair's operative report and edited where necessary. I agree with the documentation of findings.     Diamond Tam MD, MSCI  Date of Service: 03/27/24

## 2024-03-27 NOTE — DISCHARGE INSTRUCTIONS
[facility]  Home Care Following Anesthesia  For 24 hours after surgery:  Get plenty of rest.  A responsible adult must stay with you for at least 24 hours after you leave the surgery center.  Do not drive or use heavy equipment.  If you have weakness or tingling, don't drive or use heavy equipment until this feeling goes away.   Do not drink alcohol.   Avoid strenuous or risky activities.  Ask for help when climbing stairs.  You may feel lightheaded.  IF so, sit for a few minutes before standing.  Have someone help you get up.   If you have nausea (feel sick to your stomach): Drink only clear liquids such as apple juice, ginger ale, broth or 7-Up.  Rest may also help.  Be sure to drink enough fluids.  Move to a regular diet as you feel able.   You may have a slight fever.  Call the doctor if your fever is over 100 F (37.7 C) (taken under the tongue) or lasts longer than 24 hours.  You may have a dry mouth, a sore throat, muscle aches or trouble sleeping. These should go away after 24 hours.  Do not make important or legal decisions.   It is recommended to avoid smoking.               Tips for taking pain medications  To get the best pain relief possible, remember these points:  Take pain medications as directed, before pain becomes severe.  Pain medication can upset your stomach: taking it with food may help.  Constipation is a common side effect of pain medication. Drink plenty of  fluids.  Eat foods high in fiber. Take a stool softener if recommended by your doctor or pharmacist.  Do not drink alcohol, drive or operate machinery while taking pain medications.  Ask about other ways to control pain, such as with heat, ice or relaxation.    Tylenol/Acetaminophen Consumption    If you feel your pain relief is insufficient, you may take Tylenol/Acetaminophen in addition to your narcotic pain medication.   Be careful not to exceed 4,000 mg of Tylenol/Acetaminophen in a 24 hour  period from all sources.  If you are taking extra strength Tylenol/acetaminophen (500 mg), the maximum dose is 8 tablets in 24 hours.  If you are taking regular strength acetaminophen (325 mg), the maximum dose is 12 tablets in 24 hours.    Call a doctor for any of the following:  Signs of infection (fever, growing tenderness at the surgery site, a large amount of drainage or bleeding, severe pain, foul-smelling drainage, redness, swelling).  It has been over 8 to 10 hours since surgery and you are still not able to urinate (pass water).  Headache for over 24 hours.  Numbness, tingling or weakness the day after surgery (if you had spinal anesthesia).  Signs of Covid-19 infection (temperature over 100 degrees, shortness of breath, cough, loss of taste/smell, generalized body aches, persistent headache, chills, sore throat, nausea/vomiting/diarrhea)  Your doctor is:       Dr. Diamond Tam, OB/GYN: [phone]               Or dial [phone] and ask for the resident on call for:  OB/GYN  For emergency care, call the:  [facility] [phone] (TTY for hearing impaired: [phone])    Last dose of Tylenol given at 0636 . Next dose due at 12:36pm .  Last dose of NSAID (toradol or ibuprofen) given at 0800 . Next dose due at  2:00pm .  Continue to alternate Tylenol and ibuprofen every 6 hours on a regular schedule.

## 2024-03-27 NOTE — PROGRESS NOTES
Dr. Pollock and Dr. Tam aware of nipple rings in place with glass place holders. Jewelry form signed.

## 2024-03-29 LAB
PATH REPORT.COMMENTS IMP SPEC: NORMAL
PATH REPORT.COMMENTS IMP SPEC: NORMAL
PATH REPORT.FINAL DX SPEC: NORMAL
PATH REPORT.GROSS SPEC: NORMAL
PATH REPORT.MICROSCOPIC SPEC OTHER STN: NORMAL
PATH REPORT.RELEVANT HX SPEC: NORMAL
PHOTO IMAGE: NORMAL

## 2024-05-08 ENCOUNTER — MYC REFILL (OUTPATIENT)
Dept: FAMILY MEDICINE | Facility: CLINIC | Age: 24
End: 2024-05-08
Payer: COMMERCIAL

## 2024-05-08 RX ORDER — DEXTROAMPHETAMINE SACCHARATE, AMPHETAMINE ASPARTATE MONOHYDRATE, DEXTROAMPHETAMINE SULFATE AND AMPHETAMINE SULFATE 5; 5; 5; 5 MG/1; MG/1; MG/1; MG/1
CAPSULE, EXTENDED RELEASE ORAL
Status: CANCELLED | OUTPATIENT
Start: 2024-05-08

## 2024-05-09 ENCOUNTER — MYC MEDICAL ADVICE (OUTPATIENT)
Dept: FAMILY MEDICINE | Facility: CLINIC | Age: 24
End: 2024-05-09
Payer: COMMERCIAL

## 2024-05-09 NOTE — TELEPHONE ENCOUNTER
Per my last note:   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 Tested at [facility]. Prescribed by Giovana Ferro. Adderall XR 20 mg daily.     We did not discuss me taking over this medication. She should reach out to [facility] for refills. Thanks!

## 2024-05-10 ENCOUNTER — TELEPHONE (OUTPATIENT)
Dept: OBGYN | Facility: CLINIC | Age: 24
End: 2024-05-10
Payer: COMMERCIAL

## 2024-05-10 NOTE — TELEPHONE ENCOUNTER
Patient called back. Writer discussed not seeing an JIMMIE in pt chart and that we would need that before we can prescribe Adderall. Writer reached out to medical records at [facility] in [place] where pt stated she thinks that's the location she was followed at. Writer gave clinic number to call back to see if they had record of our JIMMIE.     If we need to send pt a new JIMMIE to be sent out, best email is     [email or web address]

## 2024-05-19 ENCOUNTER — HEALTH MAINTENANCE LETTER (OUTPATIENT)
Age: 24
End: 2024-05-19

## 2024-05-20 ENCOUNTER — MYC MEDICAL ADVICE (OUTPATIENT)
Dept: FAMILY MEDICINE | Facility: CLINIC | Age: 24
End: 2024-05-20
Payer: COMMERCIAL

## 2024-06-05 ENCOUNTER — MYC REFILL (OUTPATIENT)
Dept: FAMILY MEDICINE | Facility: CLINIC | Age: 24
End: 2024-06-05
Payer: COMMERCIAL

## 2024-06-05 ENCOUNTER — MYC MEDICAL ADVICE (OUTPATIENT)
Dept: OBGYN | Facility: CLINIC | Age: 24
End: 2024-06-05
Payer: COMMERCIAL

## 2024-06-05 RX ORDER — DEXTROAMPHETAMINE SACCHARATE, AMPHETAMINE ASPARTATE MONOHYDRATE, DEXTROAMPHETAMINE SULFATE AND AMPHETAMINE SULFATE 5; 5; 5; 5 MG/1; MG/1; MG/1; MG/1
CAPSULE, EXTENDED RELEASE ORAL
Status: CANCELLED | OUTPATIENT
Start: 2024-06-05

## 2024-06-12 ENCOUNTER — MYC REFILL (OUTPATIENT)
Dept: FAMILY MEDICINE | Facility: CLINIC | Age: 24
End: 2024-06-12
Payer: COMMERCIAL

## 2024-06-12 DIAGNOSIS — F90.0 ADHD (ATTENTION DEFICIT HYPERACTIVITY DISORDER), INATTENTIVE TYPE: Primary | ICD-10-CM

## 2024-06-14 PROBLEM — F90.0 ADHD (ATTENTION DEFICIT HYPERACTIVITY DISORDER), INATTENTIVE TYPE: Status: ACTIVE | Noted: 2024-06-14

## 2024-06-14 RX ORDER — DEXTROAMPHETAMINE SACCHARATE, AMPHETAMINE ASPARTATE MONOHYDRATE, DEXTROAMPHETAMINE SULFATE AND AMPHETAMINE SULFATE 5; 5; 5; 5 MG/1; MG/1; MG/1; MG/1
20 CAPSULE, EXTENDED RELEASE ORAL EVERY MORNING
Qty: 30 CAPSULE | Refills: 0 | Status: SHIPPED | OUTPATIENT
Start: 2024-06-14 | End: 2024-07-12

## 2024-06-14 NOTE — TELEPHONE ENCOUNTER
Note from [facility] reviewed.   Should schedule follow-up in the fall for continued med management (q6 month visits).     PDMP Review         Value Time User    State PDMP site checked  Yes 6/14/2024  9:40 AM Ingrid Hernández MD

## 2024-07-02 ENCOUNTER — MYC REFILL (OUTPATIENT)
Dept: FAMILY MEDICINE | Facility: CLINIC | Age: 24
End: 2024-07-02
Payer: COMMERCIAL

## 2024-07-02 DIAGNOSIS — F43.10 POSTTRAUMATIC STRESS DISORDER: Primary | ICD-10-CM

## 2024-07-08 RX ORDER — VENLAFAXINE HYDROCHLORIDE 75 MG/1
75 CAPSULE, EXTENDED RELEASE ORAL DAILY
Qty: 90 CAPSULE | Refills: 1 | Status: SHIPPED | OUTPATIENT
Start: 2024-07-08 | End: 2024-08-05

## 2024-07-08 RX ORDER — HYDROXYZINE PAMOATE 50 MG/1
50 CAPSULE ORAL AT BEDTIME
Qty: 90 CAPSULE | Refills: 1 | Status: SHIPPED | OUTPATIENT
Start: 2024-07-08

## 2024-07-08 RX ORDER — VENLAFAXINE HYDROCHLORIDE 37.5 MG/1
37.5 CAPSULE, EXTENDED RELEASE ORAL DAILY
Qty: 90 CAPSULE | Refills: 1 | Status: SHIPPED | OUTPATIENT
Start: 2024-07-08 | End: 2024-08-05

## 2024-07-12 ENCOUNTER — MYC REFILL (OUTPATIENT)
Dept: FAMILY MEDICINE | Facility: CLINIC | Age: 24
End: 2024-07-12
Payer: COMMERCIAL

## 2024-07-12 DIAGNOSIS — F90.0 ADHD (ATTENTION DEFICIT HYPERACTIVITY DISORDER), INATTENTIVE TYPE: ICD-10-CM

## 2024-07-16 RX ORDER — DEXTROAMPHETAMINE SACCHARATE, AMPHETAMINE ASPARTATE MONOHYDRATE, DEXTROAMPHETAMINE SULFATE AND AMPHETAMINE SULFATE 5; 5; 5; 5 MG/1; MG/1; MG/1; MG/1
20 CAPSULE, EXTENDED RELEASE ORAL EVERY MORNING
Qty: 30 CAPSULE | Refills: 0 | Status: SHIPPED | OUTPATIENT
Start: 2024-07-16 | End: 2024-08-13

## 2024-07-17 ENCOUNTER — OFFICE VISIT (OUTPATIENT)
Dept: OTOLARYNGOLOGY | Facility: CLINIC | Age: 24
End: 2024-07-17
Payer: COMMERCIAL

## 2024-07-17 VITALS
WEIGHT: 168 LBS | HEART RATE: 78 BPM | SYSTOLIC BLOOD PRESSURE: 110 MMHG | BODY MASS INDEX: 28.16 KG/M2 | TEMPERATURE: 97.8 F | DIASTOLIC BLOOD PRESSURE: 76 MMHG

## 2024-07-17 DIAGNOSIS — H61.23 BILATERAL IMPACTED CERUMEN: Primary | ICD-10-CM

## 2024-07-17 PROCEDURE — 99202 OFFICE O/P NEW SF 15 MIN: CPT | Mod: 25 | Performed by: OTOLARYNGOLOGY

## 2024-07-17 PROCEDURE — 69210 REMOVE IMPACTED EAR WAX UNI: CPT | Mod: 50 | Performed by: OTOLARYNGOLOGY

## 2024-07-17 ASSESSMENT — PAIN SCALES - GENERAL: PAINLEVEL: MODERATE PAIN (4)

## 2024-07-17 NOTE — NURSING NOTE
"Initial /76 (BP Location: Right arm, Patient Position: Chair, Cuff Size: Adult Regular)   Pulse 78   Temp 97.8  F (36.6  C) (Tympanic)   Wt 76.2 kg (168 lb)   BMI 28.16 kg/m   Estimated body mass index is 28.16 kg/m  as calculated from the following:    Height as of 3/27/24: 1.645 m (5' 4.76\").    Weight as of this encounter: 76.2 kg (168 lb). .  aTmara Chadwick LPN    "

## 2024-07-17 NOTE — PROGRESS NOTES
Nancy Smith is a 24 year old female  Chief Complaint: Ear blocked with cerumen  History of Present Illness  Location:both ears  Quality:cerumen  Severity:impacted  Duration:weeks    Past Medical History -   Patient Active Problem List   Diagnosis    Request for sterilization    Flexural eczema    Posttraumatic stress disorder    Seborrheic dermatitis    ADHD (attention deficit hyperactivity disorder), inattentive type       Current Medications -   Current Outpatient Medications:     acetaminophen (TYLENOL) 325 MG tablet, Take 2 tablets (650 mg) by mouth every 6 hours, Disp: 50 tablet, Rfl: 0    ammonium lactate (AMLACTIN) 12 % external cream, , Disp: , Rfl:     amphetamine-dextroamphetamine (ADDERALL XR) 20 MG 24 hr capsule, Take 1 capsule (20 mg) by mouth every morning, Disp: 30 capsule, Rfl: 0    DUPIXENT 300 MG/2ML prefilled pen, , Disp: , Rfl:     hydrOXYzine (VISTARIL) 50 MG capsule, Take 1 capsule (50 mg) by mouth at bedtime, Disp: 90 capsule, Rfl: 1    ibuprofen (ADVIL/MOTRIN) 800 MG tablet, Take 1 tablet (800 mg) by mouth every 6 hours as needed for other (mild and/or inflammatory pain), Disp: 30 tablet, Rfl: 0    ketoconazole (NIZORAL) 2 % external shampoo, , Disp: , Rfl:     metroNIDAZOLE (METROCREAM) 0.75 % external cream, , Disp: , Rfl:     triamcinolone (KENALOG) 0.1 % external ointment, , Disp: , Rfl:     venlafaxine (EFFEXOR XR) 37.5 MG 24 hr capsule, Take 1 capsule (37.5 mg) by mouth daily Take with 75 mg tablet for a total of 112.5 mg daily., Disp: 90 capsule, Rfl: 1    venlafaxine (EFFEXOR XR) 75 MG 24 hr capsule, Take 1 capsule (75 mg) by mouth daily Take with 37.5 mg tablet for a total of 112.5 mg daily., Disp: 90 capsule, Rfl: 1    Allergies - No Known Allergies    Social History -   Social History     Socioeconomic History    Marital status: Single   Tobacco Use    Smoking status: Never    Smokeless tobacco: Never   Vaping Use    Vaping status: Never Used   Substance and Sexual Activity     Alcohol use: Yes     Alcohol/week: 1.0 standard drink of alcohol     Types: 1 Glasses of wine per week     Comment: less than 1 week, purely socially    Drug use: Yes     Types: Marijuana    Sexual activity: Yes     Partners: Male, Female     Birth control/protection: I.U.D.       Family History -   Family History   Problem Relation Age of Onset    Breast Cancer Mother         not anymore       Review of Systems:   !.  Weight Loss: No   2. Difficulty Breathing: No   3. Difficulty Swallowing: No   4. Pain: No    Physical Exam  B/P: Data Unavailable, T: Data Unavailable, P: Data Unavailable, R: Data Unavailable  Vitals: /76 (BP Location: Right arm, Patient Position: Chair, Cuff Size: Adult Regular)   Pulse 78   Temp 97.8  F (36.6  C) (Tympanic)   Wt 76.2 kg (168 lb)   BMI 28.16 kg/m    BMI= There is no height or weight on file to calculate BMI.    General  Appearance - Normal  Head/Face/Scalp:    Skin - Normal    Facial Palpation - Normal    Facial Strength - Normal  Ears:    Pinna - Normal    Canal - Normal, cerumen bilaterally   Tympanic membrane - Normal  Nose:    External - Normal    Septum - Normal    Turbinates - Normal    Middle meatus - Normal  Oral Cavity:    Lips - Normal    Floor of Mouth - Normal    Gingiva - Normal    Tongue - Normal    Buccal - Normal    Palate - Normal  Nasopharynx:    Oropharynx:    Tonsils - Normal    Tongue base - Normal    Soft palate - Normal    Posterior pharyngeal wall - Normal  Hypopharynx:  Larynx:    Epiglottis -     Aryepiglottic folds -     Arytenoids -     False vocal cords -     True vocal cords -  Neck Masses - No  Neck lymphatics - no lymphadenopathy  Thyroid - Normal  Salivary glands - Normal    Audiogram - not applicable  Radiology - not applicable   Reports:   View films:  Procedures : Operating microscope as well as several instruments, such as cerumen curettes, alligator forceps and ear suction tips were used to remove impacted cerumen from the right and  the left ear.   Patient Education:     A/P - Nancy Smith is a 24 year old female  Medical Decision Making Cerumen impacted both ears  Cerumen removed from bothe ears with instruments

## 2024-07-17 NOTE — LETTER
7/17/2024      Nancy Smith  [address]  [facility] [address]      Dear Colleague,    Thank you for referring your patient, Nancy Smith, to the [facility]. Please see a copy of my visit note below.    Nancy Smith is a 24 year old female  Chief Complaint: Ear blocked with cerumen  History of Present Illness  Location:both ears  Quality:cerumen  Severity:impacted  Duration:weeks    Past Medical History -   Patient Active Problem List   Diagnosis     Request for sterilization     Flexural eczema     Posttraumatic stress disorder     Seborrheic dermatitis     ADHD (attention deficit hyperactivity disorder), inattentive type       Current Medications -   Current Outpatient Medications:      acetaminophen (TYLENOL) 325 MG tablet, Take 2 tablets (650 mg) by mouth every 6 hours, Disp: 50 tablet, Rfl: 0     ammonium lactate (AMLACTIN) 12 % external cream, , Disp: , Rfl:      amphetamine-dextroamphetamine (ADDERALL XR) 20 MG 24 hr capsule, Take 1 capsule (20 mg) by mouth every morning, Disp: 30 capsule, Rfl: 0     DUPIXENT 300 MG/2ML prefilled pen, , Disp: , Rfl:      hydrOXYzine (VISTARIL) 50 MG capsule, Take 1 capsule (50 mg) by mouth at bedtime, Disp: 90 capsule, Rfl: 1     ibuprofen (ADVIL/MOTRIN) 800 MG tablet, Take 1 tablet (800 mg) by mouth every 6 hours as needed for other (mild and/or inflammatory pain), Disp: 30 tablet, Rfl: 0     ketoconazole (NIZORAL) 2 % external shampoo, , Disp: , Rfl:      metroNIDAZOLE (METROCREAM) 0.75 % external cream, , Disp: , Rfl:      triamcinolone (KENALOG) 0.1 % external ointment, , Disp: , Rfl:      venlafaxine (EFFEXOR XR) 37.5 MG 24 hr capsule, Take 1 capsule (37.5 mg) by mouth daily Take with 75 mg tablet for a total of 112.5 mg daily., Disp: 90 capsule, Rfl: 1     venlafaxine (EFFEXOR XR) 75 MG 24 hr capsule, Take 1 capsule (75 mg) by mouth daily Take with 37.5 mg tablet for a total of 112.5 mg daily., Disp: 90 capsule, Rfl: 1    Allergies - No  Known Allergies    Social History -   Social History     Socioeconomic History     Marital status: Single   Tobacco Use     Smoking status: Never     Smokeless tobacco: Never   Vaping Use     Vaping status: Never Used   Substance and Sexual Activity     Alcohol use: Yes     Alcohol/week: 1.0 standard drink of alcohol     Types: 1 Glasses of wine per week     Comment: less than 1 week, purely socially     Drug use: Yes     Types: Marijuana     Sexual activity: Yes     Partners: Male, Female     Birth control/protection: I.U.D.       Family History -   Family History   Problem Relation Age of Onset     Breast Cancer Mother         not anymore       Review of Systems:   !.  Weight Loss: No   2. Difficulty Breathing: No   3. Difficulty Swallowing: No   4. Pain: No    Physical Exam  B/P: Data Unavailable, T: Data Unavailable, P: Data Unavailable, R: Data Unavailable  Vitals: There were no vitals taken for this visit.  BMI= There is no height or weight on file to calculate BMI.    General  Appearance - Normal  Head/Face/Scalp:    Skin - Normal    Facial Palpation - Normal    Facial Strength - Normal  Ears:    Pinna - Normal    Canal - Normal, cerumen bilaterally   Tympanic membrane - Normal  Nose:    External - Normal    Septum - Normal    Turbinates - Normal    Middle meatus - Normal  Oral Cavity:    Lips - Normal    Floor of Mouth - Normal    Gingiva - Normal    Tongue - Normal    Buccal - Normal    Palate - Normal  Nasopharynx:    Oropharynx:    Tonsils - Normal    Tongue base - Normal    Soft palate - Normal    Posterior pharyngeal wall - Normal  Hypopharynx:  Larynx:    Epiglottis -     Aryepiglottic folds -     Arytenoids -     False vocal cords -     True vocal cords -  Neck Masses - No  Neck lymphatics - no lymphadenopathy  Thyroid - Normal  Salivary glands - Normal    Audiogram - not applicable  Radiology - not applicable   Reports:   View films:  Procedures : Operating microscope as well as several instruments,  such as cerumen curettes, alligator forceps and ear suction tips were used to remove impacted cerumen from the right and the left ear.   Patient Education:     A/P - Nancy Smith is a 24 year old female  Medical Decision Making Cerumen impacted both ears  Cerumen removed from bothe ears with instruments      Again, thank you for allowing me to participate in the care of your patient.        Sincerely,        Wilbert Alanis MD

## 2024-08-05 ENCOUNTER — MYC REFILL (OUTPATIENT)
Dept: FAMILY MEDICINE | Facility: CLINIC | Age: 24
End: 2024-08-05
Payer: COMMERCIAL

## 2024-08-05 DIAGNOSIS — F43.10 POSTTRAUMATIC STRESS DISORDER: ICD-10-CM

## 2024-08-08 RX ORDER — VENLAFAXINE HYDROCHLORIDE 75 MG/1
75 CAPSULE, EXTENDED RELEASE ORAL DAILY
Qty: 90 CAPSULE | Refills: 1 | Status: SHIPPED | OUTPATIENT
Start: 2024-08-08

## 2024-08-08 RX ORDER — VENLAFAXINE HYDROCHLORIDE 37.5 MG/1
37.5 CAPSULE, EXTENDED RELEASE ORAL DAILY
Qty: 90 CAPSULE | Refills: 1 | Status: SHIPPED | OUTPATIENT
Start: 2024-08-08

## 2024-08-13 ENCOUNTER — MYC REFILL (OUTPATIENT)
Dept: FAMILY MEDICINE | Facility: CLINIC | Age: 24
End: 2024-08-13
Payer: COMMERCIAL

## 2024-08-13 DIAGNOSIS — F90.0 ADHD (ATTENTION DEFICIT HYPERACTIVITY DISORDER), INATTENTIVE TYPE: ICD-10-CM

## 2024-08-15 RX ORDER — DEXTROAMPHETAMINE SACCHARATE, AMPHETAMINE ASPARTATE MONOHYDRATE, DEXTROAMPHETAMINE SULFATE AND AMPHETAMINE SULFATE 5; 5; 5; 5 MG/1; MG/1; MG/1; MG/1
20 CAPSULE, EXTENDED RELEASE ORAL EVERY MORNING
Qty: 30 CAPSULE | Refills: 0 | Status: SHIPPED | OUTPATIENT
Start: 2024-08-15 | End: 2024-09-17

## 2024-09-17 ENCOUNTER — MYC REFILL (OUTPATIENT)
Dept: FAMILY MEDICINE | Facility: CLINIC | Age: 24
End: 2024-09-17
Payer: COMMERCIAL

## 2024-09-17 ENCOUNTER — MYC MEDICAL ADVICE (OUTPATIENT)
Dept: OBGYN | Facility: CLINIC | Age: 24
End: 2024-09-17
Payer: COMMERCIAL

## 2024-09-17 ENCOUNTER — TELEPHONE (OUTPATIENT)
Dept: OBGYN | Facility: CLINIC | Age: 24
End: 2024-09-17
Payer: COMMERCIAL

## 2024-09-17 DIAGNOSIS — F90.0 ADHD (ATTENTION DEFICIT HYPERACTIVITY DISORDER), INATTENTIVE TYPE: ICD-10-CM

## 2024-09-17 RX ORDER — DEXTROAMPHETAMINE SACCHARATE, AMPHETAMINE ASPARTATE MONOHYDRATE, DEXTROAMPHETAMINE SULFATE AND AMPHETAMINE SULFATE 5; 5; 5; 5 MG/1; MG/1; MG/1; MG/1
20 CAPSULE, EXTENDED RELEASE ORAL EVERY MORNING
Qty: 30 CAPSULE | Refills: 0 | Status: SHIPPED | OUTPATIENT
Start: 2024-09-17 | End: 2024-09-18

## 2024-09-17 NOTE — TELEPHONE ENCOUNTER
Pt requested Adderall refill. Per previous notes, follow-up appointment is required fall 2024 before Rx refills can be authorized.

## 2024-09-17 NOTE — TELEPHONE ENCOUNTER
[facility] Call Center    Phone Message    May a detailed message be left on voicemail: yes     Reason for Call: Medication Refill Request    Has the patient contacted the pharmacy for the refill? Yes   Name of medication being requested: amphetamine-dextroamphetamine (ADDERALL XR) 20 MG 24 hr capsule   Provider who prescribed the medication: Dr. Hernández  Pharmacy:   [place], [place] - [address]     Date medication is needed: ASAP    Pt was told to follow up for refill but is worried she will run out before the scheduled follow up. Pt is wondering if she can get a yousif refill until then.    Action Taken: Message routed to:  Other: [facility]    Travel Screening: Not Applicable     Date of Service:

## 2024-09-18 ENCOUNTER — MYC MEDICAL ADVICE (OUTPATIENT)
Dept: FAMILY MEDICINE | Facility: CLINIC | Age: 24
End: 2024-09-18
Payer: COMMERCIAL

## 2024-09-18 DIAGNOSIS — F90.0 ADHD (ATTENTION DEFICIT HYPERACTIVITY DISORDER), INATTENTIVE TYPE: Primary | ICD-10-CM

## 2024-09-18 RX ORDER — DEXTROAMPHETAMINE SACCHARATE, AMPHETAMINE ASPARTATE MONOHYDRATE, DEXTROAMPHETAMINE SULFATE AND AMPHETAMINE SULFATE 5; 5; 5; 5 MG/1; MG/1; MG/1; MG/1
20 CAPSULE, EXTENDED RELEASE ORAL EVERY MORNING
Qty: 30 CAPSULE | Refills: 0 | Status: SHIPPED | OUTPATIENT
Start: 2024-09-18

## 2024-09-18 NOTE — TELEPHONE ENCOUNTER
Writer send pts mychart message/request to Dr. March and pended 10mg tab Adderall for provider to sign

## 2024-09-19 RX ORDER — DEXTROAMPHETAMINE SACCHARATE, AMPHETAMINE ASPARTATE MONOHYDRATE, DEXTROAMPHETAMINE SULFATE AND AMPHETAMINE SULFATE 2.5; 2.5; 2.5; 2.5 MG/1; MG/1; MG/1; MG/1
20 CAPSULE, EXTENDED RELEASE ORAL EVERY MORNING
Qty: 60 CAPSULE | Refills: 0 | Status: SHIPPED | OUTPATIENT
Start: 2024-09-19

## 2024-10-08 ENCOUNTER — MYC REFILL (OUTPATIENT)
Dept: FAMILY MEDICINE | Facility: CLINIC | Age: 24
End: 2024-10-08
Payer: COMMERCIAL

## 2024-10-08 DIAGNOSIS — F43.10 POSTTRAUMATIC STRESS DISORDER: ICD-10-CM

## 2024-10-08 RX ORDER — HYDROXYZINE PAMOATE 50 MG/1
50 CAPSULE ORAL AT BEDTIME
Qty: 90 CAPSULE | Refills: 1 | Status: SHIPPED | OUTPATIENT
Start: 2024-10-08

## 2024-10-08 NOTE — TELEPHONE ENCOUNTER
Hydroxyzine (Vistaril) 50 mg    Last Office Visit: 3/26/24  Future [facility] Appointments: None  Medication last refilled: 7/8/24 #90 with 1 refill(s)    Prescription approved per [facility] Refill Protocol.    Mitzi Booth, NOEN, RN, CCM

## 2024-10-21 ENCOUNTER — OFFICE VISIT (OUTPATIENT)
Dept: FAMILY MEDICINE | Facility: CLINIC | Age: 24
End: 2024-10-21
Attending: FAMILY MEDICINE
Payer: COMMERCIAL

## 2024-10-21 VITALS
WEIGHT: 170 LBS | SYSTOLIC BLOOD PRESSURE: 101 MMHG | DIASTOLIC BLOOD PRESSURE: 66 MMHG | HEIGHT: 65 IN | HEART RATE: 91 BPM | BODY MASS INDEX: 28.32 KG/M2

## 2024-10-21 DIAGNOSIS — F90.0 ADHD (ATTENTION DEFICIT HYPERACTIVITY DISORDER), INATTENTIVE TYPE: ICD-10-CM

## 2024-10-21 DIAGNOSIS — F43.10 POSTTRAUMATIC STRESS DISORDER: ICD-10-CM

## 2024-10-21 PROCEDURE — G0008 ADMIN INFLUENZA VIRUS VAC: HCPCS

## 2024-10-21 PROCEDURE — G2211 COMPLEX E/M VISIT ADD ON: HCPCS | Performed by: FAMILY MEDICINE

## 2024-10-21 PROCEDURE — 250N000011 HC RX IP 250 OP 636

## 2024-10-21 PROCEDURE — 90656 IIV3 VACC NO PRSV 0.5 ML IM: CPT

## 2024-10-21 PROCEDURE — 99213 OFFICE O/P EST LOW 20 MIN: CPT | Performed by: FAMILY MEDICINE

## 2024-10-21 RX ORDER — VENLAFAXINE HYDROCHLORIDE 37.5 MG/1
37.5 CAPSULE, EXTENDED RELEASE ORAL DAILY
Qty: 90 CAPSULE | Refills: 1 | Status: SHIPPED | OUTPATIENT
Start: 2024-10-21

## 2024-10-21 RX ORDER — VENLAFAXINE HYDROCHLORIDE 75 MG/1
75 CAPSULE, EXTENDED RELEASE ORAL DAILY
Qty: 90 CAPSULE | Refills: 1 | Status: SHIPPED | OUTPATIENT
Start: 2024-10-21

## 2024-10-21 RX ORDER — DEXTROAMPHETAMINE SACCHARATE, AMPHETAMINE ASPARTATE MONOHYDRATE, DEXTROAMPHETAMINE SULFATE AND AMPHETAMINE SULFATE 5; 5; 5; 5 MG/1; MG/1; MG/1; MG/1
20 CAPSULE, EXTENDED RELEASE ORAL EVERY MORNING
Qty: 30 CAPSULE | Refills: 0 | Status: SHIPPED | OUTPATIENT
Start: 2024-10-21

## 2024-10-21 ASSESSMENT — ANXIETY QUESTIONNAIRES
1. FEELING NERVOUS, ANXIOUS, OR ON EDGE: SEVERAL DAYS
6. BECOMING EASILY ANNOYED OR IRRITABLE: MORE THAN HALF THE DAYS
GAD7 TOTAL SCORE: 6
3. WORRYING TOO MUCH ABOUT DIFFERENT THINGS: SEVERAL DAYS
7. FEELING AFRAID AS IF SOMETHING AWFUL MIGHT HAPPEN: SEVERAL DAYS
5. BEING SO RESTLESS THAT IT IS HARD TO SIT STILL: NOT AT ALL
GAD7 TOTAL SCORE: 6
2. NOT BEING ABLE TO STOP OR CONTROL WORRYING: NOT AT ALL

## 2024-10-21 ASSESSMENT — PATIENT HEALTH QUESTIONNAIRE - PHQ9
SUM OF ALL RESPONSES TO PHQ QUESTIONS 1-9: 4
5. POOR APPETITE OR OVEREATING: SEVERAL DAYS

## 2024-10-21 NOTE — PROGRESS NOTES
"  Assessment & Plan     ADHD (attention deficit hyperactivity disorder), inattentive type  Patient feels like her ADHD is well managed with her Adderall XR. This dose gets her through the day and starts to wear off around 7pm. She does not feel she needs to increase her dose. Will refill this today.  - amphetamine-dextroamphetamine (ADDERALL XR) 20 MG 24 hr capsule  Dispense: 30 capsule; Refill: 0    Anxiety  Patient feels her anxiety has been well managed with Effexor XR. Will refill this today. She also sees a therapist once a week and has been instructed to continue working with her therapist.   - venlafaxine (EFFEXOR XR) 37.5 MG 24 hr capsule  Dispense: 90 capsule; Refill: 1  - venlafaxine (EFFEXOR XR) 75 MG 24 hr capsule  Dispense: 90 capsule; Refill: 1        Nicotine/Tobacco Cessation  She reports that she has been smoking cigarettes. She has never used smokeless tobacco.  Nicotine/Tobacco Cessation Plan  Did not address today         Return if symptoms worsen or fail to improve.    Esau Cruz is a 24 year old, presenting for the following health issues:  Recheck Medication (ADHD/Anxiety )    JOSE Cruz is a 24 year old female who presents to the clinic today requesting medication refills of both her Effexor and her Adderall XR. She states that she tried to request a refill on MyChart but there was a flag that told her she needed to be seen in clinic before they could be refilled. She has been taking both of these medications as prescribed and has not missed any doses. She states that she feels like \"my brain doesn't work right when I miss a dose.\" With regards to her anxiety, she feels she has been managing this well. She has had panic attacks in the past but has not had one for a very long time. She goes to therapy once a week and has an excellent support system. She has a lot of different hobbies that keep her busy including ballet, crocheting, playing with her cats, and playing Dungeons and " "Angie. With regards to her ADHD, inattentive type, she states things have been good. She feels the Adderall XR gets her through the day but does start to wear off around 7pm. She does not feel like she needs to increase her dose. She continues to have a hard time with motivation and notes that she often has to force herself to do things last minute, however despite the low motivation, she is still able to get the things done that she needs to get done. She denies any harmful thoughts.                 Review of Systems  Constitutional, neuro, ENT, endocrine, pulmonary, cardiac, gastrointestinal, genitourinary, musculoskeletal, integument and psychiatric systems are negative, except as otherwise noted.      Objective    /66   Pulse 91   Ht 1.645 m (5' 4.76\")   Wt 77.1 kg (170 lb)   LMP 10/05/2024   BMI 28.50 kg/m    Body mass index is 28.5 kg/m .  Physical Exam   GENERAL: alert and no distress  NECK: no adenopathy, no asymmetry, masses, or scars  RESP: lungs clear to auscultation - no rales, rhonchi or wheezes  CV: regular rate and rhythm, normal S1 S2, no S3 or S4, no murmur, click or rub, no peripheral edema  MS: no gross musculoskeletal defects noted, no edema  SKIN: no suspicious lesions or rashes  PSYCH: mentation appears normal, affect normal/bright            Signed Electronically by: Jazmine March MD PhD    Time note (e3, 20'): The total of my time (on the date of service) for this service was 20 minutes, including discussion/face-to-face, chart review, interpretation not otherwise reported, documentation, and updating of the computerized record.    The longitudinal plan of care for the diagnosis(es)/condition(s) as documented were addressed during this visit. Due to the added complexity in care, I will continue to support Nancy in the subsequent management and with ongoing continuity of care.    I was present with the medical student who participated in the service and in the documentation " of the note.  I have verified the history and personally performed the physical exam and medical decision making. I agree with the assessment and plan of care as documented in the note.  Jazmine March MD, PhD      Scribe Disclosure:   I, Rimma Ibeth, am serving as a second year medical student to document services personally performed by Jazmine Mrach MD PhD -based on data collection and the provider's statements to me.     Provider Disclosure:  I agree with above History, Review of Systems, Physical exam and Plan.  I have reviewed the content of the documentation and have edited it as needed. I have personally performed the services documented here and the documentation accurately represents those services and the decisions I have made.

## 2024-10-21 NOTE — PATIENT INSTRUCTIONS
F/up for annual and pap with Dr Hernández   Continue effexor  Continue adderall   Flu shot today    Thank you for trusting us with your care!   Please be aware, if you are on Mychart, you may see your results prior to your providers review. If labs are abnormal, we will call or message you on [employer] with a follow up plan.    If you need to contact us for questions about:  Symptoms, Scheduling & Medical Questions; Non-urgent (2-3 day response) UBIKOD message, Urgent (needing response today) [phone] (if after 3:30pm next day response)   Prescriptions: Please call your Pharmacy   Billing: [place] [phone] or  Physicians:[phone]

## 2024-11-08 ENCOUNTER — MYC REFILL (OUTPATIENT)
Dept: FAMILY MEDICINE | Facility: CLINIC | Age: 24
End: 2024-11-08
Payer: COMMERCIAL

## 2024-11-08 DIAGNOSIS — F43.10 POSTTRAUMATIC STRESS DISORDER: ICD-10-CM

## 2024-11-08 DIAGNOSIS — F43.10 POSTTRAUMATIC STRESS DISORDER: Primary | ICD-10-CM

## 2024-11-08 RX ORDER — VENLAFAXINE HYDROCHLORIDE 37.5 MG/1
37.5 CAPSULE, EXTENDED RELEASE ORAL DAILY
Qty: 90 CAPSULE | Refills: 1 | OUTPATIENT
Start: 2024-11-08

## 2024-11-08 RX ORDER — HYDROXYZINE PAMOATE 50 MG/1
50 CAPSULE ORAL AT BEDTIME
Qty: 90 CAPSULE | Refills: 1 | Status: CANCELLED | OUTPATIENT
Start: 2024-11-08

## 2024-11-08 RX ORDER — VENLAFAXINE HYDROCHLORIDE 75 MG/1
75 CAPSULE, EXTENDED RELEASE ORAL DAILY
Qty: 90 CAPSULE | Refills: 1 | OUTPATIENT
Start: 2024-11-08

## 2024-11-08 NOTE — TELEPHONE ENCOUNTER
Hydroxyzine (Vistaril) 50 mg    Last Office Visit: 10/21/24  Future [facility] Appointments: None  Medication last refilled: 10/8/24 #90 with 1 refill(s)    PHQ-9 / LIDIA-7 Scores 3/12/24 8/6/24 10/21/24   LIDIA-7 Score DocFlow 6 9 6   PHQ-9 Score DocFlow 3 -- 4     New Rx already sent for 6 month supply on 10/8/24.    Mitzi Booth, BSN, RN, CCM

## 2024-11-25 ENCOUNTER — MYC REFILL (OUTPATIENT)
Dept: FAMILY MEDICINE | Facility: CLINIC | Age: 24
End: 2024-11-25
Payer: COMMERCIAL

## 2024-11-25 DIAGNOSIS — F90.0 ADHD (ATTENTION DEFICIT HYPERACTIVITY DISORDER), INATTENTIVE TYPE: ICD-10-CM

## 2024-11-25 RX ORDER — DEXTROAMPHETAMINE SACCHARATE, AMPHETAMINE ASPARTATE MONOHYDRATE, DEXTROAMPHETAMINE SULFATE AND AMPHETAMINE SULFATE 5; 5; 5; 5 MG/1; MG/1; MG/1; MG/1
20 CAPSULE, EXTENDED RELEASE ORAL EVERY MORNING
Qty: 30 CAPSULE | Refills: 0 | Status: SHIPPED | OUTPATIENT
Start: 2024-11-28

## 2024-11-25 NOTE — TELEPHONE ENCOUNTER
Refill request received for: Dextroamp-Amphet Er 20 Mg Cap  [place]  data reviewed:  Medication last refill: 30 tab, 30 day supply, filled/sold to patient on 10/29/2024  Pended order: Dextroamp-Amphet Er 20 Mg Cap, 30 tab, 30 day supply, fill on or after 11/28/2024     Date of LOV r/t Medication: 10/21/2024  Future appt scheduled? No     Refill request forwarded to provider for review.     Kassandra BELTRAN LPN  [facility]

## 2024-12-10 ENCOUNTER — MYC MEDICAL ADVICE (OUTPATIENT)
Dept: FAMILY MEDICINE | Facility: CLINIC | Age: 24
End: 2024-12-10
Payer: COMMERCIAL

## 2025-03-25 DIAGNOSIS — F90.0 ADHD (ATTENTION DEFICIT HYPERACTIVITY DISORDER), INATTENTIVE TYPE: ICD-10-CM

## 2025-03-25 RX ORDER — DEXTROAMPHETAMINE SACCHARATE, AMPHETAMINE ASPARTATE MONOHYDRATE, DEXTROAMPHETAMINE SULFATE AND AMPHETAMINE SULFATE 5; 5; 5; 5 MG/1; MG/1; MG/1; MG/1
20 CAPSULE, EXTENDED RELEASE ORAL EVERY MORNING
Qty: 30 CAPSULE | Refills: 0 | Status: SHIPPED | OUTPATIENT
Start: 2025-03-25

## 2025-03-25 NOTE — TELEPHONE ENCOUNTER
Amphetamine-dextroamphetamine 20 mg PO daily        Last Written Prescription Date:  11/28/24  Last Fill Quantity: 30,   # refills: 0  Last Office Visit : 10/21/24  Future Office visit:  None    Routing refill request to provider for review/approval because:  Drug not on the [facility], [facility] or [facility] refill protocol or controlled substance

## 2025-06-08 ENCOUNTER — HEALTH MAINTENANCE LETTER (OUTPATIENT)
Age: 25
End: 2025-06-08

## 2025-06-24 DIAGNOSIS — F90.0 ADHD (ATTENTION DEFICIT HYPERACTIVITY DISORDER), INATTENTIVE TYPE: ICD-10-CM

## 2025-06-24 RX ORDER — DEXTROAMPHETAMINE SACCHARATE, AMPHETAMINE ASPARTATE MONOHYDRATE, DEXTROAMPHETAMINE SULFATE AND AMPHETAMINE SULFATE 5; 5; 5; 5 MG/1; MG/1; MG/1; MG/1
CAPSULE, EXTENDED RELEASE ORAL
Qty: 30 CAPSULE | Refills: 0 | Status: SHIPPED | OUTPATIENT
Start: 2025-06-24

## 2025-06-24 NOTE — TELEPHONE ENCOUNTER
Last Visit Date: 10/21/2024 [facility]  Future Visit Date: none  ----------------------  Medication Requested: amphetamine-dextroamphetamine (ADDERALL XR) 20 MG 24 hr capsule   Last Written Prescription: 5/9/2025 Disp:30 R:0  ---------------------  [x]  Refill decision: Medication unable to be refilled by RN due to:     []    Pt not seen within past 12 months;  No FOV;  or FOV exceeds timeframe per protocol requirements  []    Compliance - lapse in therapy/gap in refills; No Shows; Cancellations  []    Verification - order discrepancy, clarification needed, Sig modification needed  [x]    Controlled medication  []    Medication not included in refill protocol policy  []    Abnormal labs/test:  []    Overdue labs/test:    []    Medication not active on Pt's med list  []    Drug interaction Warning  []    Medication - Last script is Reported/Historical/Transitional  []    Advanced refill request  []    Review Needed: New med; Med adjusted within <= 30 days; Safety Alert; Lab monitoring required  []    Other:     Request from pharmacy:  Requested Prescriptions   Pending Prescriptions Disp Refills    amphetamine-dextroamphetamine (ADDERALL XR) 20 MG 24 hr capsule [Pharmacy Med Name: dextroamphetamine-amphetamine ER 20 mg 24hr capsule,extend release (ADDERALL  XR)] 30 capsule 0     Sig: Take 1 Capsule (20 mg) by mouth once daily in the morning.       Rx Protocol Controlled Substance Failed - 6/24/2025 11:30 AM        Failed - Visit with relevant provider in past 3 months or upcoming 3 months (provided they have been seen in the last 6 months)        Failed - Urine drug screeen results on file in past 12 months     [unfilled]           Failed - Controlled Substance Agreement on file in last 12 months     Please review last Controlled Substance Pain agreement document.   CSA -- Encounter Level:    CSA: None found at the encounter level.       CSA -- Patient Level:    CSA: None found  at the patient level.               Failed - Auto Fail - Please forward to Provider        Passed - Medication is active on med list and the sig matches        Passed - Medication not refilled in past 28 days     Invalid Medication Grouper          Passed - No active pregnancy on record        Passed - No pregnancy test in past 12 months or most recent test was negative

## 2025-06-24 NOTE — TELEPHONE ENCOUNTER
"Received refill request for adderall. Pt last seen in clinic 10/21/24.     From that visit \"Assessment & Plan  ADHD (attention deficit hyperactivity disorder), inattentive type  Patient feels like her ADHD is well managed with her Adderall XR. This dose gets her through the day and starts to wear off around 7pm. She does not feel she needs to increase her dose. Will refill this today.  - amphetamine-dextroamphetamine (ADDERALL XR) 20 MG 24 hr capsule  Dispense: 30 capsule; Refill: 0     Anxiety  Patient feels her anxiety has been well managed with Effexor XR. Will refill this today. She also sees a therapist once a week and has been instructed to continue working with her therapist.   - venlafaxine (EFFEXOR XR) 37.5 MG 24 hr capsule  Dispense: 90 capsule; Refill: 1  - venlafaxine (EFFEXOR XR) 75 MG 24 hr capsule  Dispense: 90 capsule; Refill: 1           Nicotine/Tobacco Cessation  She reports that she has been smoking cigarettes. She has never used smokeless tobacco.  Nicotine/Tobacco Cessation Plan  Did not address today            Return if symptoms worsen or fail to improve.   \"  Pended to provider.   "

## 2025-08-08 DIAGNOSIS — F90.0 ADHD (ATTENTION DEFICIT HYPERACTIVITY DISORDER), INATTENTIVE TYPE: ICD-10-CM

## 2025-08-13 RX ORDER — DEXTROAMPHETAMINE SACCHARATE, AMPHETAMINE ASPARTATE MONOHYDRATE, DEXTROAMPHETAMINE SULFATE AND AMPHETAMINE SULFATE 5; 5; 5; 5 MG/1; MG/1; MG/1; MG/1
CAPSULE, EXTENDED RELEASE ORAL
Qty: 30 CAPSULE | Refills: 0 | Status: SHIPPED | OUTPATIENT
Start: 2025-08-13

## (undated) DEVICE — PREP CHLORAPREP 26ML TINTED ORANGE  260815

## (undated) DEVICE — LINEN TOWEL PACK X5 5464

## (undated) DEVICE — ESU GROUND PAD ADULT W/CORD E7507

## (undated) DEVICE — DRAPE UNDER BUTTOCK 8483

## (undated) DEVICE — BLADE KNIFE SURG 10 371110

## (undated) DEVICE — SOL NACL 0.9% IRRIG 500ML BOTTLE 2F7123

## (undated) DEVICE — PREP TECHNI-CARE CHLOROXYLENOL 3% 4OZ BOTTLE C222-4ZWO

## (undated) DEVICE — GLOVE BIOGEL PI MICRO INDICATOR UNDERGLOVE SZ 7.0 48970

## (undated) DEVICE — PACK LAP CHOLE CUSTOM ASC

## (undated) DEVICE — DRAPE LAVH/LAPAROSCOPY W/POUCH 29474

## (undated) DEVICE — LINEN POUCH DBL 5427

## (undated) DEVICE — ESU LIGASURE LAPAROSCOPIC BLUNT TIP SEALER 5MMX37CM LF1837

## (undated) DEVICE — SU DERMABOND ADVANCED .7ML DNX12

## (undated) DEVICE — DRAPE LEGGINGS CLEAR 8430

## (undated) DEVICE — PROTECTOR ARM ONE-STEP TRENDELENBURG 40418

## (undated) DEVICE — SU MONOCRYL 4-0 PS-2 27" UND Y426H

## (undated) DEVICE — ENDO TROCAR FIRST ENTRY KII FIOS ADV FIX 05X100MM CFF03

## (undated) DEVICE — GLOVE PROTEXIS POWDER FREE SMT 6.5  2D72PT65X

## (undated) DEVICE — KIT PATIENT POSITIONING PIGAZZI LATEX FREE 40580

## (undated) DEVICE — ENDO TROCAR SLEEVE KII ADV FIXATION 05X100MM CFS02

## (undated) RX ORDER — OXYCODONE HYDROCHLORIDE 5 MG/1
TABLET ORAL
Status: DISPENSED
Start: 2024-03-27

## (undated) RX ORDER — FENTANYL CITRATE 50 UG/ML
INJECTION, SOLUTION INTRAMUSCULAR; INTRAVENOUS
Status: DISPENSED
Start: 2024-03-27

## (undated) RX ORDER — ACETAMINOPHEN 325 MG/1
TABLET ORAL
Status: DISPENSED
Start: 2024-03-27

## (undated) RX ORDER — BUPIVACAINE HYDROCHLORIDE 2.5 MG/ML
INJECTION, SOLUTION EPIDURAL; INFILTRATION; INTRACAUDAL
Status: DISPENSED
Start: 2024-03-27